# Patient Record
Sex: FEMALE | Race: OTHER | HISPANIC OR LATINO
[De-identification: names, ages, dates, MRNs, and addresses within clinical notes are randomized per-mention and may not be internally consistent; named-entity substitution may affect disease eponyms.]

---

## 2018-03-13 ENCOUNTER — APPOINTMENT (OUTPATIENT)
Dept: ANTEPARTUM | Facility: CLINIC | Age: 53
End: 2018-03-13

## 2018-03-20 ENCOUNTER — ASOB RESULT (OUTPATIENT)
Age: 53
End: 2018-03-20

## 2018-03-20 ENCOUNTER — APPOINTMENT (OUTPATIENT)
Dept: ANTEPARTUM | Facility: CLINIC | Age: 53
End: 2018-03-20
Payer: COMMERCIAL

## 2018-03-20 PROCEDURE — 76857 US EXAM PELVIC LIMITED: CPT

## 2018-03-20 PROCEDURE — 76830 TRANSVAGINAL US NON-OB: CPT

## 2019-03-05 ENCOUNTER — APPOINTMENT (OUTPATIENT)
Age: 54
End: 2019-03-05
Payer: COMMERCIAL

## 2019-03-05 ENCOUNTER — ASOB RESULT (OUTPATIENT)
Age: 54
End: 2019-03-05

## 2019-03-05 PROCEDURE — 76830 TRANSVAGINAL US NON-OB: CPT

## 2019-03-05 PROCEDURE — 76857 US EXAM PELVIC LIMITED: CPT | Mod: 59

## 2019-04-28 ENCOUNTER — TRANSCRIPTION ENCOUNTER (OUTPATIENT)
Age: 54
End: 2019-04-28

## 2020-02-17 ENCOUNTER — NON-APPOINTMENT (OUTPATIENT)
Age: 55
End: 2020-02-17

## 2020-02-17 ENCOUNTER — APPOINTMENT (OUTPATIENT)
Dept: FAMILY MEDICINE | Facility: CLINIC | Age: 55
End: 2020-02-17
Payer: COMMERCIAL

## 2020-02-17 VITALS
OXYGEN SATURATION: 97 % | DIASTOLIC BLOOD PRESSURE: 70 MMHG | BODY MASS INDEX: 29.39 KG/M2 | HEIGHT: 58 IN | HEART RATE: 74 BPM | WEIGHT: 140 LBS | SYSTOLIC BLOOD PRESSURE: 110 MMHG

## 2020-02-17 DIAGNOSIS — Z86.39 PERSONAL HISTORY OF OTHER ENDOCRINE, NUTRITIONAL AND METABOLIC DISEASE: ICD-10-CM

## 2020-02-17 DIAGNOSIS — Z12.39 ENCOUNTER FOR OTHER SCREENING FOR MALIGNANT NEOPLASM OF BREAST: ICD-10-CM

## 2020-02-17 DIAGNOSIS — Z82.49 FAMILY HISTORY OF ISCHEMIC HEART DISEASE AND OTHER DISEASES OF THE CIRCULATORY SYSTEM: ICD-10-CM

## 2020-02-17 DIAGNOSIS — Z00.00 ENCOUNTER FOR GENERAL ADULT MEDICAL EXAMINATION W/OUT ABNORMAL FINDINGS: ICD-10-CM

## 2020-02-17 DIAGNOSIS — Z83.3 FAMILY HISTORY OF DIABETES MELLITUS: ICD-10-CM

## 2020-02-17 DIAGNOSIS — Z13.29 ENCOUNTER FOR SCREENING FOR OTHER SUSPECTED ENDOCRINE DISORDER: ICD-10-CM

## 2020-02-17 DIAGNOSIS — Z83.438 FAMILY HISTORY OF OTHER DISORDER OF LIPOPROTEIN METABOLISM AND OTHER LIPIDEMIA: ICD-10-CM

## 2020-02-17 DIAGNOSIS — Z13.21 ENCOUNTER FOR SCREENING FOR NUTRITIONAL DISORDER: ICD-10-CM

## 2020-02-17 DIAGNOSIS — Z13.1 ENCOUNTER FOR SCREENING FOR DIABETES MELLITUS: ICD-10-CM

## 2020-02-17 DIAGNOSIS — Z13.220 ENCOUNTER FOR SCREENING FOR LIPOID DISORDERS: ICD-10-CM

## 2020-02-17 PROCEDURE — 93000 ELECTROCARDIOGRAM COMPLETE: CPT

## 2020-02-17 PROCEDURE — 99386 PREV VISIT NEW AGE 40-64: CPT | Mod: 25

## 2020-02-17 RX ORDER — CHROMIUM 200 MCG
TABLET ORAL
Refills: 0 | Status: ACTIVE | COMMUNITY

## 2020-02-17 RX ORDER — ATORVASTATIN CALCIUM 20 MG/1
20 TABLET, FILM COATED ORAL
Refills: 0 | Status: ACTIVE | COMMUNITY

## 2020-02-17 RX ORDER — CALCIUM CARBONATE/VITAMIN D3 600 MG-10
TABLET ORAL
Refills: 0 | Status: ACTIVE | COMMUNITY

## 2020-02-19 LAB
25(OH)D3 SERPL-MCNC: 51.3 NG/ML
ALBUMIN SERPL ELPH-MCNC: 5 G/DL
ALP BLD-CCNC: 82 U/L
ALT SERPL-CCNC: 32 U/L
ANION GAP SERPL CALC-SCNC: 12 MMOL/L
APPEARANCE: CLEAR
AST SERPL-CCNC: 23 U/L
BACTERIA: NEGATIVE
BASOPHILS # BLD AUTO: 0.04 K/UL
BASOPHILS NFR BLD AUTO: 0.7 %
BILIRUB SERPL-MCNC: 0.5 MG/DL
BILIRUBIN URINE: NEGATIVE
BLOOD URINE: ABNORMAL
BUN SERPL-MCNC: 15 MG/DL
CALCIUM SERPL-MCNC: 9.9 MG/DL
CHLORIDE SERPL-SCNC: 104 MMOL/L
CHOLEST SERPL-MCNC: 174 MG/DL
CHOLEST/HDLC SERPL: 4.3 RATIO
CO2 SERPL-SCNC: 24 MMOL/L
COLOR: NORMAL
CREAT SERPL-MCNC: 0.54 MG/DL
EOSINOPHIL # BLD AUTO: 0.09 K/UL
EOSINOPHIL NFR BLD AUTO: 1.6 %
ESTIMATED AVERAGE GLUCOSE: 163 MG/DL
GLUCOSE QUALITATIVE U: NEGATIVE
GLUCOSE SERPL-MCNC: 149 MG/DL
HBA1C MFR BLD HPLC: 7.3 %
HCT VFR BLD CALC: 39.8 %
HDLC SERPL-MCNC: 41 MG/DL
HGB BLD-MCNC: 13.2 G/DL
HYALINE CASTS: 0 /LPF
IMM GRANULOCYTES NFR BLD AUTO: 0.4 %
KETONES URINE: NEGATIVE
LDLC SERPL CALC-MCNC: 96 MG/DL
LEUKOCYTE ESTERASE URINE: NEGATIVE
LYMPHOCYTES # BLD AUTO: 2.44 K/UL
LYMPHOCYTES NFR BLD AUTO: 43.7 %
MAN DIFF?: NORMAL
MCHC RBC-ENTMCNC: 29.9 PG
MCHC RBC-ENTMCNC: 33.2 GM/DL
MCV RBC AUTO: 90.2 FL
MICROSCOPIC-UA: NORMAL
MONOCYTES # BLD AUTO: 0.36 K/UL
MONOCYTES NFR BLD AUTO: 6.5 %
NEUTROPHILS # BLD AUTO: 2.63 K/UL
NEUTROPHILS NFR BLD AUTO: 47.1 %
NITRITE URINE: NEGATIVE
PH URINE: 6.5
PLATELET # BLD AUTO: 278 K/UL
POTASSIUM SERPL-SCNC: 5.4 MMOL/L
PROT SERPL-MCNC: 7.5 G/DL
PROTEIN URINE: NEGATIVE
RBC # BLD: 4.41 M/UL
RBC # FLD: 13.1 %
RED BLOOD CELLS URINE: 9 /HPF
SODIUM SERPL-SCNC: 140 MMOL/L
SPECIFIC GRAVITY URINE: 1.02
SQUAMOUS EPITHELIAL CELLS: 3 /HPF
TRIGL SERPL-MCNC: 187 MG/DL
TSH SERPL-ACNC: 1.01 UIU/ML
UROBILINOGEN URINE: NORMAL
WBC # FLD AUTO: 5.58 K/UL
WHITE BLOOD CELLS URINE: 1 /HPF

## 2020-02-20 DIAGNOSIS — E11.9 TYPE 2 DIABETES MELLITUS W/OUT COMPLICATIONS: ICD-10-CM

## 2020-02-20 RX ORDER — BLOOD-GLUCOSE METER
W/DEVICE EACH MISCELLANEOUS
Qty: 1 | Refills: 0 | Status: ACTIVE | COMMUNITY
Start: 2020-02-20 | End: 1900-01-01

## 2020-02-20 RX ORDER — METFORMIN HYDROCHLORIDE 500 MG/1
500 TABLET, COATED ORAL TWICE DAILY
Qty: 60 | Refills: 3 | Status: ACTIVE | COMMUNITY
Start: 2019-09-17 | End: 1900-01-01

## 2020-02-20 RX ORDER — LANCETS
EACH MISCELLANEOUS
Qty: 1 | Refills: 0 | Status: ACTIVE | COMMUNITY
Start: 2020-02-20 | End: 1900-01-01

## 2020-02-20 RX ORDER — BLOOD SUGAR DIAGNOSTIC
STRIP MISCELLANEOUS
Qty: 1 | Refills: 3 | Status: ACTIVE | COMMUNITY
Start: 2020-02-20 | End: 1900-01-01

## 2020-03-06 ENCOUNTER — APPOINTMENT (OUTPATIENT)
Dept: FAMILY MEDICINE | Facility: CLINIC | Age: 55
End: 2020-03-06
Payer: COMMERCIAL

## 2020-03-06 VITALS
HEIGHT: 58 IN | WEIGHT: 137 LBS | DIASTOLIC BLOOD PRESSURE: 70 MMHG | OXYGEN SATURATION: 97 % | HEART RATE: 74 BPM | SYSTOLIC BLOOD PRESSURE: 114 MMHG | BODY MASS INDEX: 28.76 KG/M2

## 2020-03-06 DIAGNOSIS — R82.90 UNSPECIFIED ABNORMAL FINDINGS IN URINE: ICD-10-CM

## 2020-03-06 DIAGNOSIS — E87.5 HYPERKALEMIA: ICD-10-CM

## 2020-03-06 PROCEDURE — 99214 OFFICE O/P EST MOD 30 MIN: CPT | Mod: 25

## 2020-03-06 PROCEDURE — 36415 COLL VENOUS BLD VENIPUNCTURE: CPT

## 2020-03-09 LAB
ALBUMIN SERPL ELPH-MCNC: 4.8 G/DL
ALP BLD-CCNC: 80 U/L
ALT SERPL-CCNC: 37 U/L
ANION GAP SERPL CALC-SCNC: 15 MMOL/L
APPEARANCE: CLEAR
AST SERPL-CCNC: 25 U/L
BACTERIA UR CULT: NORMAL
BACTERIA: NEGATIVE
BASOPHILS # BLD AUTO: 0.02 K/UL
BASOPHILS NFR BLD AUTO: 0.4 %
BILIRUB SERPL-MCNC: 0.2 MG/DL
BILIRUBIN URINE: NEGATIVE
BLOOD URINE: NORMAL
BUN SERPL-MCNC: 14 MG/DL
CALCIUM SERPL-MCNC: 9.9 MG/DL
CHLORIDE SERPL-SCNC: 103 MMOL/L
CO2 SERPL-SCNC: 23 MMOL/L
COLOR: COLORLESS
CREAT SERPL-MCNC: 0.51 MG/DL
EOSINOPHIL # BLD AUTO: 0.08 K/UL
EOSINOPHIL NFR BLD AUTO: 1.6 %
GLUCOSE QUALITATIVE U: NEGATIVE
GLUCOSE SERPL-MCNC: 102 MG/DL
HCT VFR BLD CALC: 39.4 %
HGB BLD-MCNC: 13 G/DL
HYALINE CASTS: 0 /LPF
IMM GRANULOCYTES NFR BLD AUTO: 0.2 %
KETONES URINE: NEGATIVE
LEUKOCYTE ESTERASE URINE: NEGATIVE
LYMPHOCYTES # BLD AUTO: 2.72 K/UL
LYMPHOCYTES NFR BLD AUTO: 53.8 %
MAN DIFF?: NORMAL
MCHC RBC-ENTMCNC: 30.2 PG
MCHC RBC-ENTMCNC: 33 GM/DL
MCV RBC AUTO: 91.6 FL
MICROSCOPIC-UA: NORMAL
MONOCYTES # BLD AUTO: 0.3 K/UL
MONOCYTES NFR BLD AUTO: 5.9 %
NEUTROPHILS # BLD AUTO: 1.93 K/UL
NEUTROPHILS NFR BLD AUTO: 38.1 %
NITRITE URINE: NEGATIVE
PH URINE: 6.5
PLATELET # BLD AUTO: 323 K/UL
POTASSIUM SERPL-SCNC: 4.9 MMOL/L
PROT SERPL-MCNC: 7.6 G/DL
PROTEIN URINE: NEGATIVE
RBC # BLD: 4.3 M/UL
RBC # FLD: 12.8 %
RED BLOOD CELLS URINE: 1 /HPF
SODIUM SERPL-SCNC: 141 MMOL/L
SPECIFIC GRAVITY URINE: 1.01
SQUAMOUS EPITHELIAL CELLS: 1 /HPF
UROBILINOGEN URINE: NORMAL
WBC # FLD AUTO: 5.06 K/UL
WHITE BLOOD CELLS URINE: 1 /HPF

## 2020-05-04 ENCOUNTER — APPOINTMENT (OUTPATIENT)
Dept: UROLOGY | Facility: CLINIC | Age: 55
End: 2020-05-04

## 2022-07-22 ENCOUNTER — EMERGENCY (EMERGENCY)
Facility: HOSPITAL | Age: 57
LOS: 1 days | Discharge: HOME | End: 2022-07-22
Attending: STUDENT IN AN ORGANIZED HEALTH CARE EDUCATION/TRAINING PROGRAM
Payer: COMMERCIAL

## 2022-07-22 VITALS
RESPIRATION RATE: 18 BRPM | HEIGHT: 58 IN | SYSTOLIC BLOOD PRESSURE: 156 MMHG | TEMPERATURE: 98 F | HEART RATE: 79 BPM | WEIGHT: 136.91 LBS | DIASTOLIC BLOOD PRESSURE: 78 MMHG | OXYGEN SATURATION: 99 %

## 2022-07-22 PROCEDURE — 80053 COMPREHEN METABOLIC PANEL: CPT

## 2022-07-22 PROCEDURE — 81001 URINALYSIS AUTO W/SCOPE: CPT

## 2022-07-22 PROCEDURE — 99284 EMERGENCY DEPT VISIT MOD MDM: CPT | Mod: 25

## 2022-07-22 PROCEDURE — 96374 THER/PROPH/DIAG INJ IV PUSH: CPT

## 2022-07-22 PROCEDURE — 83690 ASSAY OF LIPASE: CPT

## 2022-07-22 PROCEDURE — 99284 EMERGENCY DEPT VISIT MOD MDM: CPT

## 2022-07-22 PROCEDURE — 36415 COLL VENOUS BLD VENIPUNCTURE: CPT

## 2022-07-22 PROCEDURE — 85025 COMPLETE CBC W/AUTO DIFF WBC: CPT

## 2022-07-22 PROCEDURE — 0225U NFCT DS DNA&RNA 21 SARSCOV2: CPT

## 2022-07-22 RX ORDER — FAMOTIDINE 10 MG/ML
20 INJECTION INTRAVENOUS ONCE
Refills: 0 | Status: COMPLETED | OUTPATIENT
Start: 2022-07-22 | End: 2022-07-22

## 2022-07-22 RX ORDER — ACETAMINOPHEN 500 MG
650 TABLET ORAL ONCE
Refills: 0 | Status: DISCONTINUED | OUTPATIENT
Start: 2022-07-22 | End: 2022-07-22

## 2022-07-22 RX ORDER — ONDANSETRON 8 MG/1
4 TABLET, FILM COATED ORAL ONCE
Refills: 0 | Status: DISCONTINUED | OUTPATIENT
Start: 2022-07-22 | End: 2022-07-27

## 2022-07-22 RX ORDER — SODIUM CHLORIDE 9 MG/ML
2000 INJECTION INTRAMUSCULAR; INTRAVENOUS; SUBCUTANEOUS ONCE
Refills: 0 | Status: DISCONTINUED | OUTPATIENT
Start: 2022-07-22 | End: 2022-07-27

## 2022-07-22 RX ORDER — LIDOCAINE 4 G/100G
10 CREAM TOPICAL ONCE
Refills: 0 | Status: DISCONTINUED | OUTPATIENT
Start: 2022-07-22 | End: 2022-07-27

## 2022-07-22 RX ADMIN — FAMOTIDINE 20 MILLIGRAM(S): 10 INJECTION INTRAVENOUS at 23:46

## 2022-07-22 RX ADMIN — Medication 30 MILLILITER(S): at 23:46

## 2022-07-22 NOTE — ED STATDOCS - NSFOLLOWUPINSTRUCTIONS_ED_ALL_ED_FT
Náuseas y vómitos en los adultos    Nausea and Vomiting, Adult      Las náuseas son david sensación de malestar en el estómago o de que se está por vomitar. Los vómitos se producen cuando el contenido del estómago se expulsa por la boca mac consecuencia de las náuseas. Pueden hacerlo sentir débil y causar deshidratación.    La deshidratación puede hacerle sentir cansancio, sed, sequedad en la boca y disminución en la frecuencia con la que orina. Los adultos mayores y las personas que tienen otras enfermedades o un sistema de defensa (sistema inmunitario) débil tienen mayor riesgo de sufrir deshidratación. Es importante tratar los vómitos mac se lo haya indicado el médico.      Siga estas indicaciones en law[place] casa:    Controle lakeisha síntomas para detectar cualquier cambio. Informe al médico acerca de los cambios. Siga estas indicaciones para cuidarse en lawton hogar.      Comida y bebida                   •Sunman david solución de rehidratación oral (SRO). Esta es david bebida que se vende en farmacias y tiendas minoristas.      •En la medida en que pueda, jazmin líquidos michele lentamente y en pequeñas cantidades. Los líquidos michele incluyen agua, cubitos de hielo, bebidas deportivas bajas en calorías y jugo de fruta rebajado con agua (jugo de fruta diluido).      •En la medida en que pueda, consuma alimentos blandos y fáciles de digerir en pequeñas cantidades. Estos alimentos incluyen bananas, compota de manzana, arroz, celia magras, tostadas y galletas saladas.      •Evite consumir líquidos que contengan mucha azúcar o cafeína, mac bebidas energéticas, bebidas deportivas y refrescos.      •Evite huong alcohol.      •Evite los alimentos condimentados o con alto contenido de grasa.      Indicaciones generales     •Sunman los medicamentos de venta riki y los recetados solamente mac se lo haya indicado el médico.      •Jazmin suficiente líquido mac para mantener la orina de color amarillo pálido.      •Lávese las adrianne frecuentemente usando agua y jabón. Use desinfectante para adrianne si no dispone de agua y jabón.      •Asegúrese de que todas las personas que viven en lawt[place] casa se laven janina las adrianne y con frecuencia.      •Descanse en law[place] casa mientras se recupera.      •Controle [place] afección para detectar cualquier cambio.      •Cuando sienta náuseas, respire lenta y profundamente.      •Concurra a todas las visitas de control mac se lo haya indicado el médico. Hissop es importante.        Comuníquese con un médico si:    •Lakeisha síntomas empeoran.      •Aparecen nuevos síntomas.      •Tiene fiebre.      •No puede beber líquidos sin vomitar.      •Las náuseas no desaparecen después de 2 días.      •Se siente aturdido o mareado.      •Tiene dolor de dionisio.      •Tiene calambres musculares.      •Tiene david erupción cutánea.      •Siente dolor al orinar.        Solicite ayuda inmediatamente si:    •Siente dolor en el pecho, el triston, los brazos o la mandíbula.      •Se siente muy débil o se desmaya.      •Tiene vómitos persistentes.      •Vomita y el vómito es de color heller intenso o se asemeja al poso del café.      •Tiene heces con juan manuel, de color beverly, o con aspecto alquitranado.      •Siente dolor de dionisio intenso, rigidez en el triston, o ambas cosas.      •Tiene dolor intenso, cólicos o distensión abdominal.      •Tiene dificultades para respirar, o respira muy rápido.      •[place] corazón late muy rápidamente.      •Siente la piel fría y húmeda.      •Se siente confundido.    •Tiene signos de deshidratación, mac los siguientes:  •Orina de color oscuro, muy escasa o falta de orina.      •Labios agrietados.      •Sequedad de boca.      •Ojos hundidos.      •Somnolencia.      •Debilidad.        Estos síntomas pueden representar un problema grave que constituye david emergencia. No espere a faisal si los síntomas desaparecen. Solicite atención médica de inmediato. Comuníquese con el servicio de emergencias de law[place] localidad (911 en los [place]). No conduzca por lakeisha propios medios hasta el hospital.       Resumen    •Las náuseas son david sensación de malestar en el estómago o de que se está por vomitar. Si las náuseas empeoran, pueden provocar vómitos. Pueden hacerlo sentir débil y causar deshidratación.      •Siga las indicaciones del médico respecto de las comidas y las bebidas para prevenir la deshidratación.      •Sunman los medicamentos de venta riki y los recetados solamente mac se lo haya indicado el médico.      •Comuníquese con lawton médico si los síntomas empeoran o si tiene nuevos síntomas.      •Concurra a todas las visitas de control mac se lo haya indicado el médico. Hissop es importante.      Esta información no tiene mac fin reemplazar el consejo del médico. Asegúrese de hacerle al médico cualquier pregunta que tenga.

## 2022-07-22 NOTE — ED STATDOCS - CLINICAL SUMMARY MEDICAL DECISION MAKING FREE TEXT BOX
t w acute n/v likely viral vs abnormal food ingestion as likely cause, will rehydrate, labs, reevaluate.

## 2022-07-22 NOTE — ED STATDOCS - GASTROINTESTINAL, MLM
constant left chest pressure  x 1 hr pta ; hx TIA  & angina . ekg done. denies numbness/tingling/radiation
abdomen soft, non-tender, and non-distended. Bowel sounds present.

## 2022-07-22 NOTE — ED STATDOCS - PATIENT PORTAL LINK FT
You can access the FollowMyHealth Patient Portal offered by [facility] by registering at the following website: [email or web address]. By joining [facility]’s FollowMyHealth portal, you will also be able to view your health information using other applications (apps) compatible with our system.

## 2022-07-22 NOTE — ED STATDOCS - OBJECTIVE STATEMENT
58 y/o female with PMHx of HLD presents to ED for vomiting. Pt has been vomiting consistently. She states she does not know how to stop it. Pt states at 4pm felt no appetite, started vomiting and has not stopped. Pt has throat and chest pain secondary to vomiting. Yesterday had no issues and felt fine. No changes to diet, no travel, sick contacts.     : Demario

## 2022-07-23 LAB
ALBUMIN SERPL ELPH-MCNC: 4.8 G/DL — SIGNIFICANT CHANGE UP (ref 3.3–5.2)
ALP SERPL-CCNC: 83 U/L — SIGNIFICANT CHANGE UP (ref 40–120)
ALT FLD-CCNC: 30 U/L — SIGNIFICANT CHANGE UP
ANION GAP SERPL CALC-SCNC: 14 MMOL/L — SIGNIFICANT CHANGE UP (ref 5–17)
APPEARANCE UR: CLEAR — SIGNIFICANT CHANGE UP
AST SERPL-CCNC: 26 U/L — SIGNIFICANT CHANGE UP
BACTERIA # UR AUTO: ABNORMAL
BASOPHILS # BLD AUTO: 0.02 K/UL — SIGNIFICANT CHANGE UP (ref 0–0.2)
BASOPHILS NFR BLD AUTO: 0.2 % — SIGNIFICANT CHANGE UP (ref 0–2)
BILIRUB SERPL-MCNC: 0.5 MG/DL — SIGNIFICANT CHANGE UP (ref 0.4–2)
BILIRUB UR-MCNC: NEGATIVE — SIGNIFICANT CHANGE UP
BUN SERPL-MCNC: 12.3 MG/DL — SIGNIFICANT CHANGE UP (ref 8–20)
CALCIUM SERPL-MCNC: 9.3 MG/DL — SIGNIFICANT CHANGE UP (ref 8.6–10.2)
CHLORIDE SERPL-SCNC: 100 MMOL/L — SIGNIFICANT CHANGE UP (ref 98–107)
CO2 SERPL-SCNC: 23 MMOL/L — SIGNIFICANT CHANGE UP (ref 22–29)
COLOR SPEC: YELLOW — SIGNIFICANT CHANGE UP
CREAT SERPL-MCNC: 0.44 MG/DL — LOW (ref 0.5–1.3)
DIFF PNL FLD: ABNORMAL
EGFR: 113 ML/MIN/1.73M2 — SIGNIFICANT CHANGE UP
EOSINOPHIL # BLD AUTO: 0.01 K/UL — SIGNIFICANT CHANGE UP (ref 0–0.5)
EOSINOPHIL NFR BLD AUTO: 0.1 % — SIGNIFICANT CHANGE UP (ref 0–6)
EPI CELLS # UR: SIGNIFICANT CHANGE UP
GLUCOSE SERPL-MCNC: 172 MG/DL — HIGH (ref 70–99)
GLUCOSE UR QL: NEGATIVE MG/DL — SIGNIFICANT CHANGE UP
HCT VFR BLD CALC: 36.5 % — SIGNIFICANT CHANGE UP (ref 34.5–45)
HGB BLD-MCNC: 12.8 G/DL — SIGNIFICANT CHANGE UP (ref 11.5–15.5)
IMM GRANULOCYTES NFR BLD AUTO: 0.6 % — SIGNIFICANT CHANGE UP (ref 0–1.5)
KETONES UR-MCNC: ABNORMAL
LEUKOCYTE ESTERASE UR-ACNC: ABNORMAL
LIDOCAIN IGE QN: 24 U/L — SIGNIFICANT CHANGE UP (ref 22–51)
LYMPHOCYTES # BLD AUTO: 1.93 K/UL — SIGNIFICANT CHANGE UP (ref 1–3.3)
LYMPHOCYTES # BLD AUTO: 21.3 % — SIGNIFICANT CHANGE UP (ref 13–44)
MCHC RBC-ENTMCNC: 30.8 PG — SIGNIFICANT CHANGE UP (ref 27–34)
MCHC RBC-ENTMCNC: 35.1 GM/DL — SIGNIFICANT CHANGE UP (ref 32–36)
MCV RBC AUTO: 88 FL — SIGNIFICANT CHANGE UP (ref 80–100)
MONOCYTES # BLD AUTO: 0.3 K/UL — SIGNIFICANT CHANGE UP (ref 0–0.9)
MONOCYTES NFR BLD AUTO: 3.3 % — SIGNIFICANT CHANGE UP (ref 2–14)
NEUTROPHILS # BLD AUTO: 6.77 K/UL — SIGNIFICANT CHANGE UP (ref 1.8–7.4)
NEUTROPHILS NFR BLD AUTO: 74.5 % — SIGNIFICANT CHANGE UP (ref 43–77)
NITRITE UR-MCNC: NEGATIVE — SIGNIFICANT CHANGE UP
PH UR: 8 — SIGNIFICANT CHANGE UP (ref 5–8)
PLATELET # BLD AUTO: 295 K/UL — SIGNIFICANT CHANGE UP (ref 150–400)
POTASSIUM SERPL-MCNC: 4.4 MMOL/L — SIGNIFICANT CHANGE UP (ref 3.5–5.3)
POTASSIUM SERPL-SCNC: 4.4 MMOL/L — SIGNIFICANT CHANGE UP (ref 3.5–5.3)
PROT SERPL-MCNC: 7.7 G/DL — SIGNIFICANT CHANGE UP (ref 6.6–8.7)
PROT UR-MCNC: NEGATIVE — SIGNIFICANT CHANGE UP
RAPID RVP RESULT: SIGNIFICANT CHANGE UP
RBC # BLD: 4.15 M/UL — SIGNIFICANT CHANGE UP (ref 3.8–5.2)
RBC # FLD: 12.6 % — SIGNIFICANT CHANGE UP (ref 10.3–14.5)
RBC CASTS # UR COMP ASSIST: SIGNIFICANT CHANGE UP /HPF (ref 0–4)
SARS-COV-2 RNA SPEC QL NAA+PROBE: SIGNIFICANT CHANGE UP
SODIUM SERPL-SCNC: 137 MMOL/L — SIGNIFICANT CHANGE UP (ref 135–145)
SP GR SPEC: 1.01 — SIGNIFICANT CHANGE UP (ref 1.01–1.02)
UROBILINOGEN FLD QL: NEGATIVE MG/DL — SIGNIFICANT CHANGE UP
WBC # BLD: 9.08 K/UL — SIGNIFICANT CHANGE UP (ref 3.8–10.5)
WBC # FLD AUTO: 9.08 K/UL — SIGNIFICANT CHANGE UP (ref 3.8–10.5)
WBC UR QL: SIGNIFICANT CHANGE UP /HPF (ref 0–5)

## 2022-07-23 RX ORDER — FAMOTIDINE 10 MG/ML
1 INJECTION INTRAVENOUS
Qty: 10 | Refills: 0
Start: 2022-07-23 | End: 2022-07-27

## 2022-07-23 RX ORDER — ONDANSETRON 8 MG/1
1 TABLET, FILM COATED ORAL
Qty: 16 | Refills: 0
Start: 2022-07-23 | End: 2022-07-26

## 2023-03-16 ENCOUNTER — OFFICE (OUTPATIENT)
Dept: URBAN - METROPOLITAN AREA CLINIC 94 | Facility: CLINIC | Age: 58
Setting detail: OPHTHALMOLOGY
End: 2023-03-16
Payer: COMMERCIAL

## 2023-03-16 DIAGNOSIS — H52.03: ICD-10-CM

## 2023-03-16 DIAGNOSIS — H04.123: ICD-10-CM

## 2023-03-16 DIAGNOSIS — E11.3293: ICD-10-CM

## 2023-03-16 DIAGNOSIS — H01.001: ICD-10-CM

## 2023-03-16 DIAGNOSIS — H25.13: ICD-10-CM

## 2023-03-16 DIAGNOSIS — H40.033: ICD-10-CM

## 2023-03-16 DIAGNOSIS — E11.9: ICD-10-CM

## 2023-03-16 DIAGNOSIS — H01.004: ICD-10-CM

## 2023-03-16 PROCEDURE — 92015 DETERMINE REFRACTIVE STATE: CPT | Performed by: OPHTHALMOLOGY

## 2023-03-16 PROCEDURE — 99214 OFFICE O/P EST MOD 30 MIN: CPT | Performed by: OPHTHALMOLOGY

## 2023-03-16 PROCEDURE — 83861 MICROFLUID ANALY TEARS: CPT | Performed by: OPHTHALMOLOGY

## 2023-03-16 PROCEDURE — 92250 FUNDUS PHOTOGRAPHY W/I&R: CPT | Performed by: OPHTHALMOLOGY

## 2023-03-16 ASSESSMENT — KERATOMETRY
OD_K1POWER_DIOPTERS: 44.50
OD_K2POWER_DIOPTERS: 45.00
METHOD_AUTO_MANUAL: AUTO
OS_AXISANGLE_DEGREES: 091
OS_K2POWER_DIOPTERS: 44.75
OD_AXISANGLE_DEGREES: 038
OS_K1POWER_DIOPTERS: 44.25

## 2023-03-16 ASSESSMENT — REFRACTION_MANIFEST
OD_CYLINDER: SPH
OS_SPHERE: +1.00
OD_ADD: +2.25
OD_ADD: +1.75
OD_ADD: +1.75
OS_CYLINDER: SPH
OS_VA1: 20/20
OS_SPHERE: +0.75
OD_VA1: 20/20
OS_ADD: +2.25
OS_CYLINDER: SPH
OD_SPHERE: +0.50
OS_SPHERE: +1.50
OD_VA1: 20/20
OS_VA1: 20/20
OD_VA1: 20/20
OU_VA: 20/20
OS_VA1: 20/20
OD_CYLINDER: SPH
OD_SPHERE: +1.25
OD_SPHERE: +0.75
OS_ADD: +1.75
OS_ADD: +1.75

## 2023-03-16 ASSESSMENT — REFRACTION_CURRENTRX
OS_CYLINDER: SPHERE
OS_ADD: +2.00
OD_OVR_VA: 20/
OD_SPHERE: +0.75
OS_SPHERE: +1.00
OD_ADD: +2.00
OD_VPRISM_DIRECTION: PROGS
OD_CYLINDER: SPHERE
OS_VPRISM_DIRECTION: PROGS
OS_OVR_VA: 20/

## 2023-03-16 ASSESSMENT — AXIALLENGTH_DERIVED
OS_AL: 22.8129
OD_AL: 22.7274

## 2023-03-16 ASSESSMENT — REFRACTION_AUTOREFRACTION
OD_CYLINDER: -0.75
OS_AXIS: 081
OS_CYLINDER: -0.25
OS_SPHERE: +1.25
OD_AXIS: 095
OD_SPHERE: +1.50

## 2023-03-16 ASSESSMENT — CORNEAL SURGICAL SCARRING: OD_SCARRING: OLD CORNEAL

## 2023-03-16 ASSESSMENT — CONFRONTATIONAL VISUAL FIELD TEST (CVF)
OD_FINDINGS: FULL
OS_FINDINGS: FULL

## 2023-03-16 ASSESSMENT — SPHEQUIV_DERIVED
OD_SPHEQUIV: 1.125
OS_SPHEQUIV: 1.125

## 2023-03-16 ASSESSMENT — VISUAL ACUITY
OD_BCVA: 20/30-1
OS_BCVA: 20/30-1

## 2023-03-16 ASSESSMENT — TONOMETRY
OD_IOP_MMHG: 16
OS_IOP_MMHG: 17

## 2023-03-16 ASSESSMENT — LID EXAM ASSESSMENTS
OS_BLEPHARITIS: LUL 1+
OD_BLEPHARITIS: RUL 1+

## 2023-08-09 ENCOUNTER — OFFICE (OUTPATIENT)
Dept: URBAN - METROPOLITAN AREA CLINIC 94 | Facility: CLINIC | Age: 58
Setting detail: OPHTHALMOLOGY
End: 2023-08-09
Payer: COMMERCIAL

## 2023-08-09 DIAGNOSIS — H25.13: ICD-10-CM

## 2023-08-09 DIAGNOSIS — H04.123: ICD-10-CM

## 2023-08-09 DIAGNOSIS — H01.001: ICD-10-CM

## 2023-08-09 DIAGNOSIS — E11.3312: ICD-10-CM

## 2023-08-09 DIAGNOSIS — E11.9: ICD-10-CM

## 2023-08-09 DIAGNOSIS — E11.3311: ICD-10-CM

## 2023-08-09 DIAGNOSIS — H01.004: ICD-10-CM

## 2023-08-09 DIAGNOSIS — E11.3293: ICD-10-CM

## 2023-08-09 PROCEDURE — 92014 COMPRE OPH EXAM EST PT 1/>: CPT | Performed by: OPHTHALMOLOGY

## 2023-08-09 PROCEDURE — 92235 FLUORESCEIN ANGRPH MLTIFRAME: CPT | Performed by: OPHTHALMOLOGY

## 2023-08-09 PROCEDURE — 92134 CPTRZ OPH DX IMG PST SGM RTA: CPT | Performed by: OPHTHALMOLOGY

## 2023-08-09 ASSESSMENT — REFRACTION_MANIFEST
OS_ADD: +1.75
OD_CYLINDER: SPH
OD_SPHERE: +0.50
OS_SPHERE: +0.75
OD_ADD: +1.75
OD_SPHERE: +0.75
OS_SPHERE: +1.50
OD_CYLINDER: SPH
OS_VA1: 20/20
OU_VA: 20/20
OD_VA1: 20/20
OS_VA1: 20/20
OS_CYLINDER: SPH
OS_ADD: +2.25
OD_SPHERE: +1.25
OD_VA1: 20/20
OD_ADD: +1.75
OS_SPHERE: +1.00
OS_VA1: 20/20
OS_ADD: +1.75
OD_VA1: 20/20
OD_ADD: +2.25
OS_CYLINDER: SPH

## 2023-08-09 ASSESSMENT — REFRACTION_AUTOREFRACTION
OS_CYLINDER: -0.75
OS_SPHERE: +1.75
OD_CYLINDER: -0.75
OD_AXIS: 097
OS_AXIS: 080
OD_SPHERE: +1.75

## 2023-08-09 ASSESSMENT — REFRACTION_CURRENTRX
OD_ADD: +2.00
OD_SPHERE: +0.75
OS_CYLINDER: SPHERE
OD_OVR_VA: 20/
OS_OVR_VA: 20/
OS_ADD: +2.00
OD_VPRISM_DIRECTION: PROGS
OS_SPHERE: +1.00
OD_CYLINDER: SPHERE
OS_VPRISM_DIRECTION: PROGS

## 2023-08-09 ASSESSMENT — CONFRONTATIONAL VISUAL FIELD TEST (CVF)
OS_FINDINGS: FULL
OD_FINDINGS: FULL

## 2023-08-09 ASSESSMENT — KERATOMETRY
OS_K2POWER_DIOPTERS: 44.25
OD_K1POWER_DIOPTERS: 44.50
OS_AXISANGLE_DEGREES: 127
OS_K1POWER_DIOPTERS: 44.25
METHOD_AUTO_MANUAL: AUTO
OD_AXISANGLE_DEGREES: 037
OD_K2POWER_DIOPTERS: 44.00

## 2023-08-09 ASSESSMENT — SPHEQUIV_DERIVED
OD_SPHEQUIV: 1.375
OS_SPHEQUIV: 1.375

## 2023-08-09 ASSESSMENT — VISUAL ACUITY
OS_BCVA: 20/40
OD_BCVA: 20/30+1

## 2023-08-09 ASSESSMENT — TONOMETRY
OD_IOP_MMHG: 18
OS_IOP_MMHG: 21

## 2023-08-09 ASSESSMENT — AXIALLENGTH_DERIVED
OD_AL: 22.8077
OS_AL: 22.8077

## 2023-08-09 ASSESSMENT — CORNEAL SURGICAL SCARRING: OD_SCARRING: OLD CORNEAL

## 2023-08-09 ASSESSMENT — LID EXAM ASSESSMENTS
OS_BLEPHARITIS: LUL 1+
OD_BLEPHARITIS: RUL 1+

## 2023-09-22 ENCOUNTER — ASC (OUTPATIENT)
Dept: URBAN - METROPOLITAN AREA SURGERY 8 | Facility: SURGERY | Age: 58
Setting detail: OPHTHALMOLOGY
End: 2023-09-22
Payer: COMMERCIAL

## 2023-09-22 DIAGNOSIS — E11.3312: ICD-10-CM

## 2023-09-22 PROCEDURE — 67210 TREATMENT OF RETINAL LESION: CPT | Performed by: OPHTHALMOLOGY

## 2023-09-22 ASSESSMENT — REFRACTION_CURRENTRX
OD_VPRISM_DIRECTION: PROGS
OS_SPHERE: +1.00
OS_ADD: +2.00
OS_CYLINDER: SPHERE
OD_CYLINDER: SPHERE
OD_OVR_VA: 20/
OS_OVR_VA: 20/
OD_ADD: +2.00
OD_SPHERE: +0.75
OS_VPRISM_DIRECTION: PROGS

## 2023-09-22 ASSESSMENT — REFRACTION_MANIFEST
OD_CYLINDER: SPH
OD_SPHERE: +0.75
OD_SPHERE: +1.25
OD_ADD: +1.75
OU_VA: 20/20
OS_SPHERE: +0.75
OS_CYLINDER: SPH
OS_VA1: 20/20
OD_ADD: +1.75
OS_SPHERE: +1.00
OS_VA1: 20/20
OD_SPHERE: +0.50
OS_VA1: 20/20
OS_ADD: +2.25
OD_VA1: 20/20
OS_ADD: +1.75
OS_ADD: +1.75
OD_ADD: +2.25
OD_CYLINDER: SPH
OS_SPHERE: +1.50
OS_CYLINDER: SPH

## 2023-09-22 ASSESSMENT — KERATOMETRY
OS_K2POWER_DIOPTERS: 44.25
OD_K1POWER_DIOPTERS: 44.50
OD_AXISANGLE_DEGREES: 037
OS_K1POWER_DIOPTERS: 44.25
METHOD_AUTO_MANUAL: AUTO
OS_AXISANGLE_DEGREES: 127
OD_K2POWER_DIOPTERS: 44.00

## 2023-09-22 ASSESSMENT — AXIALLENGTH_DERIVED
OD_AL: 22.8077
OS_AL: 22.8077

## 2023-09-22 ASSESSMENT — SPHEQUIV_DERIVED
OD_SPHEQUIV: 1.375
OS_SPHEQUIV: 1.375

## 2023-09-22 ASSESSMENT — REFRACTION_AUTOREFRACTION
OS_AXIS: 080
OD_AXIS: 097
OS_CYLINDER: -0.75
OS_SPHERE: +1.75
OD_CYLINDER: -0.75
OD_SPHERE: +1.75

## 2023-09-22 ASSESSMENT — VISUAL ACUITY
OS_BCVA: 20/40
OD_BCVA: 20/30+1

## 2023-10-20 ENCOUNTER — ASC (OUTPATIENT)
Dept: URBAN - METROPOLITAN AREA SURGERY 8 | Facility: SURGERY | Age: 58
Setting detail: OPHTHALMOLOGY
End: 2023-10-20
Payer: COMMERCIAL

## 2023-10-20 DIAGNOSIS — E11.3311: ICD-10-CM

## 2023-10-20 PROCEDURE — 67210 TREATMENT OF RETINAL LESION: CPT | Mod: 79,RT | Performed by: OPHTHALMOLOGY

## 2023-10-20 ASSESSMENT — VISUAL ACUITY
OD_BCVA: 20/30+1
OS_BCVA: 20/40

## 2023-10-20 ASSESSMENT — REFRACTION_MANIFEST
OS_SPHERE: +0.75
OD_VA1: 20/20
OD_ADD: +1.75
OS_SPHERE: +1.50
OS_VA1: 20/20
OD_ADD: +2.25
OD_SPHERE: +1.25
OS_ADD: +1.75
OD_ADD: +1.75
OS_CYLINDER: SPH
OD_CYLINDER: SPH
OS_VA1: 20/20
OD_VA1: 20/20
OU_VA: 20/20
OD_VA1: 20/20
OS_CYLINDER: SPH
OS_ADD: +2.25
OD_CYLINDER: SPH
OS_SPHERE: +1.00
OS_VA1: 20/20
OD_SPHERE: +0.75
OS_ADD: +1.75
OD_SPHERE: +0.50

## 2023-10-20 ASSESSMENT — AXIALLENGTH_DERIVED
OS_AL: 22.8077
OD_AL: 22.8077

## 2023-10-20 ASSESSMENT — KERATOMETRY
OD_AXISANGLE_DEGREES: 037
OD_K2POWER_DIOPTERS: 44.00
OD_K1POWER_DIOPTERS: 44.50
OS_AXISANGLE_DEGREES: 127
OS_K2POWER_DIOPTERS: 44.25
METHOD_AUTO_MANUAL: AUTO
OS_K1POWER_DIOPTERS: 44.25

## 2023-10-20 ASSESSMENT — REFRACTION_CURRENTRX
OS_ADD: +2.00
OD_VPRISM_DIRECTION: PROGS
OD_CYLINDER: SPHERE
OS_VPRISM_DIRECTION: PROGS
OD_SPHERE: +0.75
OD_ADD: +2.00
OS_OVR_VA: 20/
OS_CYLINDER: SPHERE
OD_OVR_VA: 20/
OS_SPHERE: +1.00

## 2023-10-20 ASSESSMENT — REFRACTION_AUTOREFRACTION
OS_CYLINDER: -0.75
OD_SPHERE: +1.75
OD_AXIS: 097
OD_CYLINDER: -0.75
OS_AXIS: 080
OS_SPHERE: +1.75

## 2023-10-20 ASSESSMENT — SPHEQUIV_DERIVED
OD_SPHEQUIV: 1.375
OS_SPHEQUIV: 1.375